# Patient Record
Sex: FEMALE | Race: WHITE
[De-identification: names, ages, dates, MRNs, and addresses within clinical notes are randomized per-mention and may not be internally consistent; named-entity substitution may affect disease eponyms.]

---

## 2019-02-04 ENCOUNTER — HOSPITAL ENCOUNTER (EMERGENCY)
Dept: HOSPITAL 62 - ER | Age: 49
Discharge: HOME | End: 2019-02-04
Payer: COMMERCIAL

## 2019-02-04 VITALS — SYSTOLIC BLOOD PRESSURE: 140 MMHG | DIASTOLIC BLOOD PRESSURE: 77 MMHG

## 2019-02-04 DIAGNOSIS — M46.1: Primary | ICD-10-CM

## 2019-02-04 DIAGNOSIS — M79.10: ICD-10-CM

## 2019-02-04 DIAGNOSIS — F17.200: ICD-10-CM

## 2019-02-04 LAB
APPEARANCE UR: CLEAR
APTT PPP: (no result) S
BILIRUB UR QL STRIP: NEGATIVE
GLUCOSE UR STRIP-MCNC: NEGATIVE MG/DL
KETONES UR STRIP-MCNC: NEGATIVE MG/DL
NITRITE UR QL STRIP: NEGATIVE
PH UR STRIP: 7 [PH] (ref 5–9)
PROT UR STRIP-MCNC: NEGATIVE MG/DL
SP GR UR STRIP: 1
UROBILINOGEN UR-MCNC: NEGATIVE MG/DL (ref ?–2)

## 2019-02-04 PROCEDURE — 73502 X-RAY EXAM HIP UNI 2-3 VIEWS: CPT

## 2019-02-04 PROCEDURE — 99283 EMERGENCY DEPT VISIT LOW MDM: CPT

## 2019-02-04 PROCEDURE — 96372 THER/PROPH/DIAG INJ SC/IM: CPT

## 2019-02-04 PROCEDURE — 81025 URINE PREGNANCY TEST: CPT

## 2019-02-04 PROCEDURE — 72110 X-RAY EXAM L-2 SPINE 4/>VWS: CPT

## 2019-02-04 PROCEDURE — 81001 URINALYSIS AUTO W/SCOPE: CPT

## 2019-02-04 NOTE — ER DOCUMENT REPORT
ED Medical Screen (RME)





- General


Chief Complaint: Leg Pain


Stated Complaint: WORK INJ/HIP PAIN


Time Seen by Provider: 02/04/19 11:59


Primary Care Provider: 


HEALTH,EMPLOYEE [Primary Care Provider] - Follow up as needed


Notes: 





48-year-old pleasant female to the emergency department for evaluation of pain 

that shooting down her right leg.  Starts in her lumbar spine and shoots down 

the right leg.  Sometimes the leg goes numb.  No change in bowel or bladder 

function.  No fever.  Patient states that she is a employee here at [facility].  Works with environmental services.  Slipped and fell last 

week on a wet floor and landed very hard on the right buttock and right hip 

area.  Also complaining of some numbness in her right arm.





I have greeted and performed a rapid initial assessment of this patient.  A 

comprehensive ED assessment and evaluation of the patient, analysis of test 

results and completion of the medical decision making process will be conducted 

by additional ED providers.





- Related Data


Allergies/Adverse Reactions: 


                                        





No Known Allergies Allergy (Unverified 02/04/19 10:53)


   











Past Medical History





- Social History


Frequency of alcohol use: Occasional


Drug Abuse: None


Renal/ Medical History: Denies: Hx Peritoneal Dialysis





Physical Exam





- Vital signs


Vitals: 





                                        











Temp Pulse Resp BP Pulse Ox


 


 98.6 F   70   20   155/82 H  100 


 


 02/04/19 11:11  02/04/19 11:11  02/04/19 11:11  02/04/19 11:11  02/04/19 11:11














Course





- Vital Signs


Vital signs: 





                                        











Temp Pulse Resp BP Pulse Ox


 


 98.6 F   70   20   155/82 H  100 


 


 02/04/19 11:11  02/04/19 11:11  02/04/19 11:11  02/04/19 11:11  02/04/19 11:11














Doctor's Discharge





- Discharge


Referrals: 


HEALTH,EMPLOYEE [Primary Care Provider] - Follow up as needed

## 2019-02-04 NOTE — RADIOLOGY REPORT (SQ)
EXAM DESCRIPTION:  L SPINE WHOLE



COMPLETED DATE/TIME:  2/4/2019 12:47 pm



REASON FOR STUDY:  pain s/p  fall



COMPARISON:  None.



NUMBER OF VIEWS:  Five views including obliques.



TECHNIQUE:  AP, lateral, oblique, and sacral radiographic images acquired of the lumbar spine.



LIMITATIONS:  None.



FINDINGS:  MINERALIZATION: Normal.

SEGMENTATION: Normal.  No transitional anatomy.

ALIGNMENT: Normal.

VERTEBRAE: Maintained height.  No fracture or worrisome bone lesion.

DISCS: Preserved height.  No significant osteophytes or end plate irregularity.

POSTERIOR ELEMENTS: Pedicles and facets are intact.  No pars defect or posterior arch defects.

HARDWARE: None in the spine.

PARASPINAL SOFT TISSUES: Normal.

PELVIS: Intact as visualized. No fractures or worrisome bone lesions. SI joints intact.

OTHER: No other significant finding.



IMPRESSION:  NORMAL 5 VIEW LUMBAR SPINE.



TECHNICAL DOCUMENTATION:  JOB ID:  [id number]

 2011 [employer]- All Rights Reserved



Reading location - IP/workstation name: [email or web address]

## 2019-02-04 NOTE — RADIOLOGY REPORT (SQ)
EXAM DESCRIPTION:  HIP RIGHT AP/LATERAL



COMPLETED DATE/TIME:  2/4/2019 12:47 pm



REASON FOR STUDY:  pain, s/p fall



COMPARISON:  None.



NUMBER OF VIEWS:  Two views.



TECHNIQUE:  AP pelvis and additional frog-leg view of the right hip.



LIMITATIONS:  None.



FINDINGS:  MINERALIZATION: Normal.

RIGHT HIP: No fracture or dislocation.  No worrisome bone lesions.

LEFT HIP: No fracture or dislocation.  No worrisome bone lesions.

PUBIS AND ISCHIUM: No fracture.

PELVIS: No fracture.

SACRUM: No fracture or dislocation. No worrisome bone lesions.

LOWER LUMBAR SPINE: No fracture or dislocation. No worrisome bone lesions.  No significant disc disea
se.

SOFT TISSUES: No findings.

OTHER: No other significant finding.



IMPRESSION:  NEGATIVE STUDY OF THE RIGHT HIP. NO RADIOGRAPHIC EVIDENCE OF ACUTE INJURY.



TECHNICAL DOCUMENTATION:  JOB ID:  [id number]

 2011 [employer]- All Rights Reserved



Reading location - IP/workstation name: [email or web address]

## 2019-10-01 NOTE — ER DOCUMENT REPORT
HPI





- HPI


Time Seen by Provider: 02/04/19 11:59


Pain Level: 4


Notes: 





Patient is a 48-year-old female with a history of tobacco abuse who presents 

emergency department complaining of right buttock pain status post injury on 

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  Patient states that she 

came out of an area and slipped on a wet floor that she did not know was 

recently cleaned/mopped.  Patient states that she landed on her buttock at that 

time.  Patient states that she did not have any immediate pain, but has been 

having issues near the end of her shifts with associated pain and soreness.  

Patient states that over the last couple days it has progressed to radiating 

pain down the back of her right leg to the knee.  Patient states that applying 

pressure to that area and movements make the pain worse.  She is eating and 

drinking without any difficulties. She is urinating normally and having normal 

bowel movements.  She has not had any injections or procedures to his lower 

back.  Denies any IV drug abuse.  No other concerns or complaints.  Denies any 

headache, fever, head injury, neck pain, changes in 

vision/speech/mentation/hearing, URI, sore throat, chest pain, palpitations, 

syncope, cough, shortness of breath, wheeze, dyspnea, abdominal pain, 

nausea/vomiting/diarrhea, urinary retention, dysuria, hematuria, loss of control

of bowel or bladder, numbness/tingling, saddle anesthesia, muscle 

paralysis/weakness, or rash.











- ROS


Systems Reviewed and Negative: Yes All other systems reviewed and negative





- REPRODUCTIVE


Reproductive: DENIES: Pregnant:





- DERM


Skin Color: Normal





Past Medical History





- Social History


Smoking Status: Current Every Day Smoker


Frequency of alcohol use: Occasional


Drug Abuse: None


Family History: Reviewed & Not Pertinent


Patient has suicidal ideation: No


Patient has homicidal ideation: No


Renal/ Medical History: Denies: Hx Peritoneal Dialysis





Vertical Provider Document





- CONSTITUTIONAL


Agree With Documented VS: Yes


Notes: 





PHYSICAL EXAMINATION:





GENERAL: Well-appearing, well-nourished and in no acute distress. 





LUNGS: Breath sounds clear to auscultation bilaterally and equal.  No wheezes 

rales or rhonchi.





HEART: Regular rate and rhythm without murmurs, rubs, gallops.





ABDOMEN: Soft, nontender, nondistended abdomen.  No guarding, no rebound.  No 

masses appreciated.  Normal bowel sounds present.  No CVA tenderness 

bilaterally.  No pulsatile mass





Musculoskeletal: LE's b/l:  FROM to passive/active. Strength 5+/5.  No deficits 

noted.  No bony tenderness of extremities.





Back:  FROM to passive/active.  Strength 5+/5.  No vertebral point tenderness, 

stepoffs, or deformities.  No other bony tenderness, erythema, swelling, or 

ecchymosis.  SLR negative b/l.  + rt SI jt tenderness, reproduces pain 

described.  No foot drop





Extremities:  No cyanosis, clubbing, or edema b/l.  Peripheral pulses 2+.  

Capillary refill less than 2 seconds.





NEUROLOGICAL: Normal speech, limping gait.  Normal sensory, motor exams.  

Reflexes 2+ b/l.  





PSYCH: Normal mood, normal affect.





SKIN: Warm, Dry, normal turgor, no rashes or lesions noted.





Course





- Re-evaluation


Re-evalutation: 





02/04/19 13:26


Patient is an afebrile, well-hydrated, 48-year-old female who presents to the ED

with Rt buttock pain, suspect sacroiliitis.  Vitals are acceptable.  PE is 

otherwise unremarkable for any focal neurological deficits.  X-rays unremarkable

for any acute pathology.  Toradol given IM.  She has no significant tachycardia,

tachypnea, or hypoxia.  She is nontoxic-appearing and is tolerating p.o. without

difficulties.  There are no signs of infection.  No other red flag symptoms 

noted.  No other labs or imaging warranted at this time based on H&P.  Low 

suspicion for any meningitis, fracture, expanding/ruptured AAA, cauda equina 

syndrome, epidural mass lesion/abscess, herniated disc causing severe spinal 

stenosis, or other systemic infection at this time.  Patient is aware that this 

condition can change from initial presentation and that she needs monitor 

symptoms closely for any acute changes.  I will send her home with a 

prescription for naproxen.  Conservative measures otherwise for symptoms.  

Recheck with your PCM in 3-5 days.  Consider consult with orthopedic/physical 

therapy.  Return to the ED with any worsening/concerning symptoms otherwise as 

reviewed discharge.  Patient is in agreement.





- Vital Signs


Vital signs: 


                                        











Temp Pulse Resp BP Pulse Ox


 


 98.6 F   70   20   155/82 H  100 


 


 02/04/19 11:11  02/04/19 11:11  02/04/19 11:11  02/04/19 11:11  02/04/19 11:11














Discharge





- Discharge


Clinical Impression: 


 Right buttock pain, Sacroiliitis





Condition: Stable


Disposition: HOME, SELF-CARE


Instructions:  Stretching Exercises for the Back ([facility])


Additional Instructions: 


Rest, Ice


Tylenol/ibuprofen as needed


Light stretches daily


Strength exercises as able


Moist heat and massage may help


F/u with your PCP in 3-5 days for a recheck


Consider consult(s) with Orthopedics/physical therapy for ongoing/worsening 

symptoms





Return to the ED with any worsening symptoms and/or development of fever, 

headache, chest pain, palpitations, syncope, shortness of breath, trouble 

breathing, abdominal pain, n/v/d, blood in stool/urine, loss of control of 

bowel/bladder, urinary retention, muscle weakness/paralysis, saddle anesthesia, 

numbness/tingling, or other worsening symptoms that are concerning to you.


























Work Instructions (for hospital):


Light duty preferred for 3 days with sedentary position and 

ambulating/stretching every 15-20 minutes.


Avoid any heavy lifting, pulling, or pushing >10 pounds.


If continued symptoms after 3 days, you may need a consult with 

Orthopedics/Family provider for further evaluation and management.


Prescriptions: 


Naproxen 500 mg PO BID #20 tablet


Forms:  Elevated Blood Pressure, Smoking Cessation Education


Referrals: 


HEALTH,EMPLOYEE [Primary Care Provider] - Follow up as needed


[facility] [Provider Group] - Follow up as needed [facility] [address]  Dept: [phone]      RJNTYN TRANSFER CONSENT    NAME Lucas Dawson                                         1989                              MRN [id number]    I have been informed of my rights regarding examination, treatment, and transfer   by Dr Trav Hastings MD    Benefits: Specialized equipment and/or services available at the receiving facility (Include comment)________________________    Risks: Potential for delay in receiving treatment, Potential deterioration of medical condition, Increased discomfort during transfer      Consent for Transfer:  I acknowledge that my medical condition has been evaluated and explained to me by the emergency department physician or other qualified medical person and/or my attending physician, who has recommended that I be transferred to the service of  Accepting Physician: Dr Carolina Valentin at [address] [place] [address] Name, [facility] : [facility]  The above potential benefits of such transfer, the potential risks associated with such transfer, and the probable risks of not being transferred have been explained to me, and I fully understand them  The doctor has explained that, in my case, the benefits of transfer outweigh the risks  I agree to be transferred  I authorize the performance of emergency medical procedures and treatments upon me in both transit and upon arrival at the receiving facility  Additionally, I authorize the release of any and all medical records to the receiving facility and request they be transported with me, if possible  I understand that the safest mode of transportation during a medical emergency is an ambulance and that the Hospital advocates the use of this mode of transport   Risks of traveling to the receiving facility by car, including absence of medical control, life sustaining equipment, such as oxygen, and medical personnel has been explained to me and I fully understand them  (MALINDA CORRECT BOX BELOW)  [  ]  I consent to the stated transfer and to be transported by ambulance/helicopter  [  ]  I consent to the stated transfer, but refuse transportation by ambulance and accept full responsibility for my transportation by car  I understand the risks of non-ambulance transfers and I exonerate the Hospital and its staff from any deterioration in my condition that results from this refusal     X___________________________________________    DATE  10/01/19  TIME________  Signature of patient or legally responsible individual signing on patient behalf           RELATIONSHIP TO PATIENT_________________________          Provider Certification    NAME James Benoit                                         1989                              MRN [id number]    A medical screening exam was performed on the above named patient  Based on the examination:    Condition Necessitating Transfer The primary encounter diagnosis was Essential hypertension  A diagnosis of Depression was also pertinent to this visit      Patient Condition: The patient has been stabilized such that within reasonable medical probability, no material deterioration of the patient condition or the condition of the unborn child(tre) is likely to result from the transfer    Reason for Transfer: Level of Care needed not available at this facility    Transfer Requirements: Facility [facility]   · Space available and qualified personnel available for treatment as acknowledged by    · Agreed to accept transfer and to provide appropriate medical treatment as acknowledged by       Dr Irene Manzo  · Appropriate medical records of the examination and treatment of the patient are provided at the time of transfer   [address], [address] _______  · Transfer will be performed by qualified personnel from    and appropriate transfer equipment as required, including the use of necessary and appropriate life support measures  Provider Certification: I have examined the patient and explained the following risks and benefits of being transferred/refusing transfer to the patient/family:  General risk, such as traffic hazards, adverse weather conditions, rough terrain or turbulence, possible failure of equipment (including vehicle or aircraft), or consequences of actions of persons outside the control of the transport personnel, Unanticipated needs of medical equipment and personnel during transport      Based on these reasonable risks and benefits to the patient and/or the unborn child(tre), and based upon the information available at the time of the patients examination, I certify that the medical benefits reasonably to be expected from the provision of appropriate medical treatments at another medical facility outweigh the increasing risks, if any, to the individuals medical condition, and in the case of labor to the unborn child, from effecting the transfer      X____________________________________________ DATE 10/01/19        TIME_______      ORIGINAL - SEND TO MEDICAL RECORDS   COPY - SEND WITH PATIENT DURING TRANSFER

## 2020-10-11 ENCOUNTER — HOSPITAL ENCOUNTER (EMERGENCY)
Dept: HOSPITAL 62 - ER | Age: 50
Discharge: HOME | End: 2020-10-11
Payer: SELF-PAY

## 2020-10-11 VITALS — SYSTOLIC BLOOD PRESSURE: 143 MMHG | DIASTOLIC BLOOD PRESSURE: 80 MMHG

## 2020-10-11 DIAGNOSIS — M54.32: Primary | ICD-10-CM

## 2020-10-11 DIAGNOSIS — F17.200: ICD-10-CM

## 2020-10-11 PROCEDURE — 99284 EMERGENCY DEPT VISIT MOD MDM: CPT

## 2020-10-11 PROCEDURE — 96372 THER/PROPH/DIAG INJ SC/IM: CPT

## 2020-10-11 NOTE — ER DOCUMENT REPORT
HPI





- HPI


Patient complains to provider of: sciatica


Time Seen by Provider: 10/11/20 14:17


Pain Level: 1


Context: 





50-year-old female with a previous history of sciatica presents to the emergency

room complaining of a flareup up for sciatica since last Tuesday.  She denies 

any new trauma or injury.  States the pain does radiate down her left leg.  Has 

been taking ibuprofen and naproxen with minimal relief.  She denies any loss 

control of bowels or bladder.  No saddle anesthesia.  No red flags.  States she 

usually gets a shot of steroids and muscle relaxers for relief of her pain.


Associated Symptoms: None


Exacerbated by: Movement, Walking


Relieved by: Denies


Similar symptoms previously: Yes - History of sciatica


Recently seen / treated by doctor: No





- ROS


Systems Reviewed and Negative: Yes All other systems reviewed and negative





- NEURO


Neurology: DENIES: Weakness





- REPRODUCTIVE


Reproductive: DENIES: Pregnant:





- MUSCULOSKELETAL


Musculoskeletal: REPORTS: Extremity pain.  DENIES: Back Pain





- DERM


Skin Color: Normal


Skin Problems: None





Past Medical History





- General


Information source: Patient





- Social History


Smoking Status: Current Every Day Smoker


Frequency of alcohol use: None


Drug Abuse: None


Family History: Reviewed & Not Pertinent


Renal/ Medical History: Denies: Hx Peritoneal Dialysis





Vertical Provider Document





- CONSTITUTIONAL


Agree With Documented VS: Yes


Exam Limitations: No Limitations


General Appearance: Mild Distress





- INFECTION CONTROL


TRAVEL OUTSIDE OF THE [place] IN LAST 30 DAYS: No





- HEENT


HEENT: Atraumatic, Normocephalic





- NECK


Neck: Normal Inspection, Supple





- RESPIRATORY


Respiratory: Breath Sounds Normal, No Respiratory Distress





- CARDIOVASCULAR


Cardiovascular: Regular Rate, Regular Rhythm, No Murmur





- BACK


Back: Normal Inspection - Nontender to palpation over the vertebral spine.  No 

obvious step-offs.  No deformities.  Negative straight leg raising bilaterally..

 negative: CVA Tenderness-Right, CVA Tenderness-Left





- MUSCULOSKELETAL/EXTREMETIES


Musculoskeletal/Extremeties: Tender - Patient has tenderness over the left iliac

crest.  Able to flex and extend left hip without difficulty.  Does have pain 

with abduction of the left hip.  No obvious deformities palpated.





- NEURO


Level of Consciousness: Awake, Alert, Appropriate


Motor/Sensory: No Motor Deficit, No Sensory Deficit


Deep Tendon Reflexes: 2+


Notes: 





Patient is ambulatory with a steady gait.  She is neurovascularly intact.





- DERM


Integumentary: Warm, Dry, No Rash





Course





- Re-evaluation


Re-evalutation: 





10/11/20 14:26


Patient with a known history of sciatica.  States she has not had a flare up in 

several years.  Has been taking naproxen and ibuprofen with minimal relief.  She

denies any acute trauma or injury.  States normally when it flares up she gets a

steroid injection and muscle relaxers.  Patient will be given IM Solu-Medrol, 

p.o. Flexeril.  Will be discharged home on p.o. prednisone and Flexeril as 

prescribed.  She was counseled to use heat 20 minutes 3 times a day.  Outpatient

follow-up with orthopedics if not improving in 2 to 3 days.  On-call physician 

was provided.  Patient was given strict return to the emergency room guidelines.

 Return for any new or worsening symptoms.  All questions were answered.  

Patient verbalized understanding and agrees with plan of care.


10/11/20 16:57








- Vital Signs


Vital signs: 


                                        











Temp Pulse Resp BP Pulse Ox


 


 98.3 F   93   18   143/80 H  98 


 


 10/11/20 14:13  10/11/20 14:13  10/11/20 14:13  10/11/20 14:13  10/11/20 14:13














Discharge





- Discharge


Clinical Impression: 


 Left sided sciatica





Condition: Stable


Disposition: HOME, SELF-CARE


Instructions:  Sciatica ([facility])


Additional Instructions: 





You have been seen in the Emergency Department (ED)  today for sciatica your 

workup and exam have not shown any acute abnormalities and you are likely 

suffering from muscle strain or possible problems with your discs, but there is 

no treatment that will fix your symptoms at this time.  Please take the Flexeril

and prednisone that has been prescribed as directed. You should also purchase a 

local lidocaine cream such as "aspercreme with lidocaine" and use per bottle 

instructions to the affected area. Apply heat to the area as often as you are 

able. Continue to keep active and avoid prolonged periods of bed rest.


 


Please follow up with your doctor as soon as possible regarding today's ED visit

and your back pain.  Return to the ED for worsening back pain, fever, weakness 

or numbness of either leg, or if you develop either (1) an inability to urinate 

or have bowel movements, or (2) loss of your ability to control your bathroom 

functions (if you start having "accidents"), or if you develop other new 

symptoms that concern you.concern you.


Prescriptions: 


Prednisone [Deltasone 20 mg Tablet] See Protocol PO DAILY 9 Days #18 tablet


Cyclobenzaprine HCl [Flexeril 10 mg Tablet] 10 mg PO TIDP PRN #15 tab


 PRN Reason: 


Forms:  Return to Work


Referrals: 


AMELIA RED DO [ACTIVE STAFF] - Follow up as needed